# Patient Record
Sex: MALE | Race: BLACK OR AFRICAN AMERICAN | Employment: UNEMPLOYED | URBAN - METROPOLITAN AREA
[De-identification: names, ages, dates, MRNs, and addresses within clinical notes are randomized per-mention and may not be internally consistent; named-entity substitution may affect disease eponyms.]

---

## 2020-07-16 ENCOUNTER — HOSPITAL ENCOUNTER (EMERGENCY)
Age: 50
Discharge: HOME | End: 2020-07-16
Attending: EMERGENCY MEDICINE
Payer: COMMERCIAL

## 2020-07-16 VITALS
BODY MASS INDEX: 25.77 KG/M2 | HEIGHT: 70 IN | HEART RATE: 94 BPM | DIASTOLIC BLOOD PRESSURE: 92 MMHG | SYSTOLIC BLOOD PRESSURE: 122 MMHG | OXYGEN SATURATION: 99 % | RESPIRATION RATE: 16 BRPM | TEMPERATURE: 98.4 F | WEIGHT: 180 LBS

## 2020-07-16 PROCEDURE — 99282 EMERGENCY DEPT VISIT SF MDM: CPT

## 2020-07-16 RX ORDER — DOXYCYCLINE 100 MG/1
100 TABLET ORAL 2 TIMES DAILY
Qty: 20 TABLET | Refills: 0 | Status: SHIPPED | OUTPATIENT
Start: 2020-07-16 | End: 2020-07-26

## 2020-07-16 ASSESSMENT — PAIN SCALES - GENERAL: PAINLEVEL_OUTOF10: 10

## 2020-07-16 ASSESSMENT — PAIN DESCRIPTION - LOCATION: LOCATION: SCROTUM

## 2020-07-16 ASSESSMENT — PAIN DESCRIPTION - PAIN TYPE: TYPE: ACUTE PAIN

## 2020-07-16 NOTE — ED PROVIDER NOTES
Cardiovascular: 2+ radial pulses bilaterally. Respiratory: Unlabored breathing with equal chest rise and fall. Abdomen: Soft and nontender, without guarding or rebound tenderness. No masses or hepatosplenomegaly. : Normal-appearing external male genitalia. No penile lesions or discharge noted. On the inferior surface of the scrotum, approximately the midline, there are 2 small adjacent fluctuant nodules, consistent with small abscesses. There is significant associated induration and tenderness to palpation. Skin: Warm and dry, without rashes or ecchymoses, lacerations or abrasions. Neuro: Alert and oriented x3. No focal neurologic deficits are noted. Extremities: Warm and well-perfused, without clubbing, cyanosis, or edema. The patient moves all extremities equally. Psych: The patient's mood and affect are generally within normal limits for their presentation. Diagnostic Results       RADIOLOGY:  No orders to display       LABS:   No results found for this visit on 07/16/20. RECENT VITALS:  BP: (!) 122/92, Temp: 98.4 °F (36.9 °C), Pulse: 94, Resp: 16, SpO2: 99 %     Procedures       ED Course     Nursing Notes, Past Medical Hx, Past Surgical Hx, Social Hx, Allergies, and Family Hx were reviewed. The patient was given the following medications:  Orders Placed This Encounter   Medications    doxycycline monohydrate (ADOXA) 100 MG tablet     Sig: Take 1 tablet by mouth 2 times daily for 10 days     Dispense:  20 tablet     Refill:  0       CONSULTS:  None    MEDICAL DECISION MAKING / ASSESSMENT / Bernice Koh is a 53 y.o. male who presents with a two-week history of a \"knot\" on his scrotum. On examination, he has what appears to be a small scrotal wall abscess, with minimal surrounding cellulitis. I discussed with the patient that the appropriate treatment is incision and drainage in the emergency department, followed by antibiotics.   The patient was very reticent to allow incision and drainage. I spent some time explaining the procedure and the fact that treatment with antibiotics without source control has a relatively high failure rate. He was given some time to think about his decision. On further discussion with the patient, he continues to decline incision and drainage in the emergency department, understanding that treatment with antibiotics alone does have a high failure rate. He was given a referral to the general surgery clinic so that he may follow-up as an outpatient if the abscess persists. He was given return precautions for the emergency department should he have any increasing pain, swelling, fevers, vomiting, spreading warmth or redness, or other concerning symptoms. Clinical Impression     1. Scrotal wall abscess        Disposition     PATIENT REFERRED TO:  [facility]  [address] [facility] [address] [facility]    Schedule an appointment as soon as possible for a visit   If symptoms worsen      DISCHARGE MEDICATIONS:  Discharge Medication List as of 7/16/2020  7:52 AM      START taking these medications    Details   doxycycline monohydrate (ADOXA) 100 MG tablet Take 1 tablet by mouth 2 times daily for 10 days, Disp-20 tablet,R-0Print             DISPOSITION Decision To Discharge    (Please note that portions of this note were completed with voice recognition software.   Efforts were made to edit the dictations but occasionally words are mis-transcribed.)     Bethany Gonzalez MD  07/16/20 [id number]

## 2020-09-16 ENCOUNTER — APPOINTMENT (OUTPATIENT)
Dept: GENERAL RADIOLOGY | Age: 50
End: 2020-09-16
Payer: COMMERCIAL

## 2020-09-16 ENCOUNTER — HOSPITAL ENCOUNTER (EMERGENCY)
Age: 50
Discharge: HOME | End: 2020-09-16
Attending: EMERGENCY MEDICINE
Payer: COMMERCIAL

## 2020-09-16 VITALS
DIASTOLIC BLOOD PRESSURE: 86 MMHG | OXYGEN SATURATION: 100 % | TEMPERATURE: 98.2 F | HEART RATE: 62 BPM | SYSTOLIC BLOOD PRESSURE: 141 MMHG | RESPIRATION RATE: 18 BRPM

## 2020-09-16 PROCEDURE — 99283 EMERGENCY DEPT VISIT LOW MDM: CPT

## 2020-09-16 PROCEDURE — 73090 X-RAY EXAM OF FOREARM: CPT

## 2020-09-16 PROCEDURE — 73060 X-RAY EXAM OF HUMERUS: CPT

## 2020-09-16 PROCEDURE — 73562 X-RAY EXAM OF KNEE 3: CPT

## 2020-09-16 PROCEDURE — 6370000000 HC RX 637 (ALT 250 FOR IP): Performed by: PHYSICIAN ASSISTANT

## 2020-09-16 PROCEDURE — 73080 X-RAY EXAM OF ELBOW: CPT

## 2020-09-16 RX ORDER — OXYCODONE HYDROCHLORIDE 5 MG/1
5 TABLET ORAL ONCE
Status: COMPLETED | OUTPATIENT
Start: 2020-09-16 | End: 2020-09-16

## 2020-09-16 RX ORDER — ACETAMINOPHEN 500 MG
1000 TABLET ORAL ONCE
Status: COMPLETED | OUTPATIENT
Start: 2020-09-16 | End: 2020-09-16

## 2020-09-16 RX ORDER — IBUPROFEN 400 MG/1
800 TABLET ORAL ONCE
Status: COMPLETED | OUTPATIENT
Start: 2020-09-16 | End: 2020-09-16

## 2020-09-16 RX ADMIN — OXYCODONE 5 MG: 5 TABLET ORAL at 12:51

## 2020-09-16 RX ADMIN — IBUPROFEN 800 MG: 400 TABLET ORAL at 11:36

## 2020-09-16 RX ADMIN — ACETAMINOPHEN 1000 MG: 500 TABLET ORAL at 11:36

## 2020-09-16 ASSESSMENT — PAIN DESCRIPTION - ONSET: ONSET: ON-GOING

## 2020-09-16 ASSESSMENT — PAIN DESCRIPTION - FREQUENCY: FREQUENCY: CONTINUOUS

## 2020-09-16 ASSESSMENT — PAIN DESCRIPTION - PAIN TYPE: TYPE: ACUTE PAIN

## 2020-09-16 ASSESSMENT — PAIN SCALES - GENERAL
PAINLEVEL_OUTOF10: 10

## 2020-09-16 ASSESSMENT — PAIN DESCRIPTION - DESCRIPTORS: DESCRIPTORS: SHARP;STABBING

## 2020-09-16 ASSESSMENT — PAIN DESCRIPTION - LOCATION: LOCATION: ARM;KNEE

## 2020-09-16 NOTE — ED NOTES
Patient prepared for and ready to be discharged. Patient discharged at this time in no acute distress after verbalizing understanding of discharge instructions. Patient left after receiving After Visit Summary instructions.         Evaristo Spurling, RN  09/16/20 0842

## 2020-09-16 NOTE — ED PROVIDER NOTES
ED Attending Attestation Note     Date of evaluation: 9/16/2020    This patient was seen by the DAVID. I have seen and examined the patient, agree with the workup, evaluation, management and diagnosis. The care plan has been discussed. I was present for any procedures performed in the DAVID's note and have made edits to the note where appropriate. My assessment reveals 53 y.o. male presenting after head injury while using a riding lawnmower. The standing platform on the back fell off and he notes a twisting and popping pain in his left knee as well as falling on his right elbow. Patient is alert, GCS 15. Has significant swelling to the supracondylar posterior region just proximal to the right elbow. Neurovascularly intact distal to this. Has some mild tenderness along the medial aspect of the joint line of the left knee with an occasional click and pop with range of motion. Has been able to bear weight and ambulate. Suspect possible meniscal injury or mild MCL injury. Will obtain x-rays of the knee and elbow area.          Vonda Sullivan MD  09/16/20 1145

## 2020-09-16 NOTE — ED PROVIDER NOTES
Mouth/Throat:      Mouth: Mucous membranes are moist.   Eyes:      Conjunctiva/sclera: Conjunctivae normal.   Neck:      Musculoskeletal: Normal range of motion. No muscular tenderness. Cardiovascular:      Rate and Rhythm: Normal rate. Pulses: Normal pulses. Pulmonary:      Effort: Pulmonary effort is normal. No respiratory distress. Abdominal:      Palpations: Abdomen is soft. Musculoskeletal:      Comments: No tenderness to midline T or L-spine. Swelling noted to right elbow with tenderness to palpation of radial head. Full range of motion at wrist and all digits. 2+ radial pulse. Sensation intact to entire extremity. Mild tenderness noted to distal humerus and proximal forearm as well. No tenderness to palpation over clavicle or shoulder. Left upper extremity normal.  Right lower extremity normal.  Full range of motion of left lower extremity, specifically at knee joint, with mild tenderness along medial joint line. No significant laxity noted. Negative Lachman's test.  No significant joint effusion noted. Able to ambulate without difficulty. Skin:     General: Skin is warm and dry. Neurological:      General: No focal deficit present. Mental Status: He is alert and oriented to person, place, and time. Diagnostic Results     RADIOLOGY:  XR HUMERUS RIGHT (MIN 2 VIEWS)   Final Result   1. Bipartite patella knee, no fracture or dislocation   2. Degenerative changes of the right elbow with no fracture or dislocation. 3. Normal right humerus   4. Radius and ulna are normal.      XR ELBOW RIGHT (MIN 3 VIEWS)   Final Result   1. Bipartite patella knee, no fracture or dislocation   2. Degenerative changes of the right elbow with no fracture or dislocation. 3. Normal right humerus   4. Radius and ulna are normal.      XR RADIUS ULNA RIGHT (2 VIEWS)   Final Result   1. Bipartite patella knee, no fracture or dislocation   2.  Degenerative changes of the right elbow with no was given a referral to orthopedics team for follow-up as an outpatient and was discharged in stable condition and able to ambulate out of the emergency department without difficulty. This patient was also evaluated by the attending physician. All care plans were discussed and agreed upon. Clinical Impression     1. Fall, initial encounter    2. Acute pain of left knee    3.  Right elbow pain        Disposition     PATIENT REFERRED TO:  Eladio Ramirez MD  [address] [place] [address]  [phone]    Schedule an appointment as soon as possible for a visit         DISCHARGE MEDICATIONS:  Discharge Medication List as of 9/16/2020 12:48 PM          DISPOSITION Decision To Discharge 09/16/2020 12:19:16 PM        Aure Shirley PA-C  09/16/20 9733

## 2020-09-16 NOTE — ED TRIAGE NOTES
Pt was mowing lawn on a standing  and fell off. Pt states that he was dragged for a bit and now has a knot above the R elbow. Pt also hit L knee on the ground.

## 2020-09-24 ENCOUNTER — OFFICE VISIT (OUTPATIENT)
Dept: ORTHOPEDIC SURGERY | Age: 50
End: 2020-09-24
Payer: COMMERCIAL

## 2020-09-24 VITALS — WEIGHT: 180 LBS | BODY MASS INDEX: 25.77 KG/M2 | HEIGHT: 70 IN

## 2020-09-24 PROCEDURE — 99203 OFFICE O/P NEW LOW 30 MIN: CPT | Performed by: ORTHOPAEDIC SURGERY

## 2020-09-24 PROCEDURE — G8419 CALC BMI OUT NRM PARAM NOF/U: HCPCS | Performed by: ORTHOPAEDIC SURGERY

## 2020-09-24 PROCEDURE — G8427 DOCREV CUR MEDS BY ELIG CLIN: HCPCS | Performed by: ORTHOPAEDIC SURGERY

## 2020-09-24 PROCEDURE — 4004F PT TOBACCO SCREEN RCVD TLK: CPT | Performed by: ORTHOPAEDIC SURGERY

## 2020-09-24 NOTE — PROGRESS NOTES
hematoma formation in this area with no overlying skin changes  Minimal swelling throughout the remainder of the forearm arm and elbow    Palpation: Tenderness to palpation near triceps insertion with no obvious palpable gap but palpable swelling is noted so difficult to palpate any bony or tendinous anatomy in this area  No tenderness globally throughout the remainder of the elbow forearm or wrist    Range of Motion: Gentle range of motion testing today from approximately 10 to 130 degrees of flexion with no obvious crepitus or gross instability and mild discomfort particularly with terminal flexion    Strength: 5 out of 5 strength all major muscle groups C5-T1 right upper extremity except weakness and discomfort with attempted resisted elbow extension particularly in a position of hyperflexion to extension the patient does have weakness in approximately 4 out of 5 strength    Special Tests: Gross sensation intact median ulnar and radial nerve without deficit  2+ palpable radial pulse with brisk capillary refill all fingers    Skin: There are no additional worrisome rashes, ulcerations or lesions. Gait: normal    Circulation normal    Additional Comments:     Additional Examinations:  Left Upper Extremity: Examination of the left upper extremity does not show any tenderness, deformity or injury. Range of motion is unremarkable. There is no gross instability. There are no rashes, ulcerations or lesions. Strength and tone are normal.      Radiology:     X-rays reviewed in office:  Images of the right elbow and humerus from 9/16/2020 reviewed which demonstrate enthesophyte and there did appear to be several corticated fragments with 1 fragment on lateral imaging proximal to the olecranon process suspicious for possible bony marissa or avulsion      Assessment: 50-year-old male presenting with history of right elbow injury after being forcefully pulled from a lawnmower  1.   Right elbow swelling and pain, suspicion for right triceps tendon avulsion    Impression:  Encounter Diagnosis   Name Primary?  Right elbow pain Yes       Office Procedures:  Orders Placed This Encounter   Procedures    MRI ELBOW RIGHT WO CONTRAST     Standing Status:   Future     Standing Expiration Date:   9/24/2021     Scheduling Instructions:      MRI Right Elbow      R/O Triceps tendon tear            Once Approved Patient will Schedule at [facility] 1574: I had a discussion with the patient today regarding his presentation. Based on the swelling posteriorly as well as his x-ray suspicious for possible displaced cortical fragment and his weakness on exam I have a suspicion for triceps tendon avulsion.   The patient does not have any palpable gap today but he does have some swelling posteriorly near the triceps insertion making is difficult to palpate or detect  At this time I recommend prompt further work-up including obtaining advanced imaging with MRI of the right elbow to evaluate for possible triceps tendon avulsion or injury  He may remain in the sling for now and may remove several times per day just to work on some range of motion for his shoulder wrist and light range of motion of the elbow but no forceful lifting gripping or use of the right upper extremity otherwise  We will plan to follow-up after MRI is completed to discuss results and further treatment options based on results

## 2020-09-25 ENCOUNTER — TELEPHONE (OUTPATIENT)
Dept: ORTHOPEDIC SURGERY | Age: 50
End: 2020-09-25

## 2020-09-25 NOTE — TELEPHONE ENCOUNTER
SPOKE WITH PATIENT TO INFORM HIM MRI WAS APPROVED & [facility]

## 2020-10-02 ENCOUNTER — TELEPHONE (OUTPATIENT)
Dept: ORTHOPEDIC SURGERY | Age: 50
End: 2020-10-02

## 2020-10-02 NOTE — TELEPHONE ENCOUNTER
Called to schedule his follow up visit to go over MRI Results. He must make this appointment on 10/6/2020.

## 2020-10-05 ENCOUNTER — TELEPHONE (OUTPATIENT)
Dept: ORTHOPEDIC SURGERY | Age: 50
End: 2020-10-05

## 2020-10-05 NOTE — TELEPHONE ENCOUNTER
SPOKE WITH PATIENT STATES HE WAS UNABLE TO GET HIS MRI COMPLETED 10/3 AT [facility] DUE TO HAVING A  ANKLE MONITOR ON. STATES HE WILL CONTACT HIS  TO FIGURE OUT A WAY TO HAVE THE MRI DONE.

## 2020-10-06 ENCOUNTER — TELEPHONE (OUTPATIENT)
Dept: ORTHOPEDIC SURGERY | Age: 50
End: 2020-10-06

## 2020-10-06 NOTE — TELEPHONE ENCOUNTER
CALLED PATIENT TO INFORM HIM  HE WILL NEED TO HAVE AN ULTRASOUND DONE TODAY  IF HE'S UNABLE TO GET AN MRI COMPLETED.- LEFT VM MESSAGE

## 2020-10-07 ENCOUNTER — TELEPHONE (OUTPATIENT)
Dept: ORTHOPEDIC SURGERY | Age: 50
End: 2020-10-07

## 2020-10-07 NOTE — TELEPHONE ENCOUNTER
RETURNED SPOUSE (Kris Jones ) CALL- WANTED TO SCHEDULE ULTRASOUND W/ DR. Donita Alas 94 FOR 10/8 @ 1:30PM

## 2020-10-08 ENCOUNTER — OFFICE VISIT (OUTPATIENT)
Dept: ORTHOPEDIC SURGERY | Age: 50
End: 2020-10-08
Payer: COMMERCIAL

## 2020-10-08 VITALS — TEMPERATURE: 98.5 F | BODY MASS INDEX: 25.75 KG/M2 | HEIGHT: 70 IN | WEIGHT: 179.9 LBS

## 2020-10-08 PROCEDURE — 99242 OFF/OP CONSLTJ NEW/EST SF 20: CPT | Performed by: INTERNAL MEDICINE

## 2020-10-08 PROCEDURE — G8419 CALC BMI OUT NRM PARAM NOF/U: HCPCS | Performed by: INTERNAL MEDICINE

## 2020-10-08 PROCEDURE — G8427 DOCREV CUR MEDS BY ELIG CLIN: HCPCS | Performed by: INTERNAL MEDICINE

## 2020-10-08 PROCEDURE — G8484 FLU IMMUNIZE NO ADMIN: HCPCS | Performed by: INTERNAL MEDICINE

## 2020-10-08 NOTE — PROGRESS NOTES
Highest education level: Not on file   Occupational History    Not on file   Social Needs    Financial resource strain: Not on file    Food insecurity     Worry: Not on file     Inability: Not on file    Transportation needs     Medical: Not on file     Non-medical: Not on file   Tobacco Use    Smoking status: Current Every Day Smoker     Packs/day: 0.25     Types: Cigarettes    Smokeless tobacco: Never Used   Substance and Sexual Activity    Alcohol use: Yes     Comment: socially    Drug use: Yes     Types: Marijuana     Comment: former drug abuse    Sexual activity: Yes     Partners: Female   Lifestyle    Physical activity     Days per week: Not on file     Minutes per session: Not on file    Stress: Not on file   Relationships    Social connections     Talks on phone: Not on file     Gets together: Not on file     Attends [employer]ism service: Not on file     Active member of club or organization: Not on file     Attends meetings of clubs or organizations: Not on file     Relationship status: Not on file    Intimate partner violence     Fear of current or ex partner: Not on file     Emotionally abused: Not on file     Physically abused: Not on file     Forced sexual activity: Not on file   Other Topics Concern    Not on file   Social History Narrative    Not on file        Review of Symptoms:    Pertinent items are noted in HPI    Review of systems reviewed from Patient History Form dated on 9/24/2020 and   available in the patient's chart under the Media tab. Vital Signs:      Vitals:    10/08/20 1357   Temp: 98.5 °F (36.9 °C)        General Exam:     Constitutional: Patient is adequately groomed with no evidence of malnutrition  Mental Status: The patient is oriented to time, place and person. The patient's mood and affect are appropriate. Vascular: Examination reveals no swelling or calf tenderness. Peripheral pulses are palpable and 2+.     Lymphatics: no lymphadenopathy of the inguinal region or lower extremity      Physical Exam: right arm      Primary Exam:    Inspection: There is asymmetric fullness/bulge distal triceps insertion region      Palpation: Tenderness distal triceps needle distribution      Range of Motion: Full range low-grade discomfort end range flexion      Strength: Submaximal resisted with pain in extension      Special Tests: Negative      Skin: There are no rashes, ulcerations or lesions. Gait: Nonantalgic      Neurovascular - non focal and intact       Additional Comments:        Additional Examinations:                   Office Imaging Results/Procedures PerformedToday:         Office Procedures:     Orders Placed This Encounter   Procedures    US EXTREMITY RIGHT NON VASC LIMITED     Standing Status:   Future     Number of Occurrences:   1     Standing Expiration Date:   10/8/2021     Order Specific Question:   Reason for exam:     Answer:   dx       Limited ultrasound evaluation of the right elbow  Logic E ultrasound 10 to 12 MHz standard and virtual convex imaging utilized      Findings: Partial thickness incomplete tear (30%) of the distal triceps tendon with retraction and avulsion of cortical fragments-superficial and medial fiber distribution. The patient was position seated with the elbow flexed palm of his hand resting on his thigh. The extensor aspect the elbow was evaluated extensively with linear transducer. The triceps tendon was evaluated extensively long axis and short axis. There was evidence of a incomplete partial thickness rupture of the superior medial fibers of the triceps tendon with retraction. Rupture extending approximately with involvement of the distal myotendinous junction with loss of convexity of the contour of the tendon in this anatomic location consistent retracted tear. Cortical avulsion fragment is apparent sonographically and on x-ray located approximately 3 cm proximal to the olecranon tip.   The tear resents approximately 30% thickness of the tendon. With dynamic stressing and passive stressing of the triceps there is no evidence of complete rupture or gapping to suggest complete rupture. There is subcutis edema and hemorrhagic fluid signal within the superior aspect of the triceps tendon in this anatomic distribution. There is tenderness to sono palpation at this anatomic location. This anatomic location correlates with the mild \"bulge\" evident on clinical examination. Other Outside Imaging and Testing Personally Reviewed:    Us Extremity Right Non Vasc Limited    Result Date: 10/8/2020  Radiology result is complete; follow up with provider / physician office for radiology results      X-rays reviewed 9/24/2020        Assessment   Impression: . Encounter Diagnoses   Name Primary?  Traumatic rupture of triceps tendon, right, initial encounter     Right elbow pain Yes        Partial incomplete triceps tendon tear superficial fiber diffuse distribution    Plan: Active modification-triceps strain protocol  Clinical follow-up with Dr. Noé Gomez to discuss treatment options. The nature of the finding, probable diagnosis and likely treatment was thoroughly discussed with the patient. The options, risks, complications, alternative treatment as well as some of the differential diagnosis was discussed. The patient was thoroughly informed and all questions were answered. the patient indicated understanding and satisfaction with the discussion. Orders:        Orders Placed This Encounter   Procedures    US EXTREMITY RIGHT NON VASC LIMITED     Standing Status:   Future     Number of Occurrences:   1     Standing Expiration Date:   10/8/2021     Order Specific Question:   Reason for exam:     Answer:   dx           Disclaimer: \"This note was dictated with voice recognition software. Though review and correction are routine, we apologize for any errors. \"

## 2020-10-14 ENCOUNTER — OFFICE VISIT (OUTPATIENT)
Dept: ORTHOPEDIC SURGERY | Age: 50
End: 2020-10-14
Payer: COMMERCIAL

## 2020-10-14 PROCEDURE — G8427 DOCREV CUR MEDS BY ELIG CLIN: HCPCS | Performed by: ORTHOPAEDIC SURGERY

## 2020-10-14 PROCEDURE — 99212 OFFICE O/P EST SF 10 MIN: CPT | Performed by: ORTHOPAEDIC SURGERY

## 2020-10-14 PROCEDURE — 4004F PT TOBACCO SCREEN RCVD TLK: CPT | Performed by: ORTHOPAEDIC SURGERY

## 2020-10-14 PROCEDURE — G8484 FLU IMMUNIZE NO ADMIN: HCPCS | Performed by: ORTHOPAEDIC SURGERY

## 2020-10-14 PROCEDURE — G8419 CALC BMI OUT NRM PARAM NOF/U: HCPCS | Performed by: ORTHOPAEDIC SURGERY

## 2020-10-14 NOTE — PROGRESS NOTES
Assessment: 27-year-old male presenting with history of right elbow injury after being forcefully pulled from a lawnmower  1. Right elbow partial triceps tear and avulsion, approximately 30% based on ultrasound  Treatment Plan: I discussed with the patient today regarding his injury and we discussed results of ultrasound. He does appear to have partial triceps tendon tear which does correlate with his exam today  We discussed treatment options and based on his current exam as well as his ultrasound I think that his injury would be amenable to consider conservative management. We discussed both operative and nonoperative treatment. [place] decision was made to proceed with nonoperative treatment but I would like him specifically to rest and protect his triceps while healing can occur. Specifically we discussed avoiding forceful hyperextension of his elbow and triceps activation including no dips or push-ups forceful or body weight, and also no isolated triceps extension exercises for now. We will plan to see him back in 1 month for repeat evaluation    No follow-ups on file. History of Present Illness  Hemalatha Stokes is a 53 y.o. male   , right-hand-dominant, works as a , presenting with history of right elbow injury  The patient reports a history of any fall approximately 1 week ago. The patient was cutting grass at his home when the foot pedal broke on the lawnmower when he was standing up and the mower subsequently pulled him forcefully. The patient felt a pull and heard a pop in his right elbow at the time  He was seen in the emergency department where images were obtained demonstrating reportedly no evidence of an acute fracture    Interval history: Patient presents today approximately 3 weeks status post injury  We had attempted to obtain an MRI for the patient with suspected triceps tendon injury.   He was unable to get the MRI but he did have an ultrasound performed last week to evaluate for triceps injury. Ultrasound study did demonstrate partial tear of distal triceps tendon approximately 30% estimated from ultrasound  The patient has been feeling better since his last visit. He does continue to have some tenderness. He has started doing some workout including push-ups and other exercises. No other new injuries or complaints reported today    Review of Systems  Pertinent items are noted in HPI  Review of systems reviewed from Patient History Form dated on 9/24/20 and available in the patient's chart under the Media tab. Vital Signs  There were no vitals filed for this visit. Physical Exam  Constitutional:  Patient is well-nourished and demonstrates normal hygiene. Mental Status:  Patient is alert and oriented to person, place and time. Skin:  Intact, no rashes or lesions.      Right Elbow/right upper extremity examination  Inspection:  Mild swelling at the posterior elbow just proximal to the olecranon process with what appears to be small hematoma formation in this area with no overlying skin changes  No swelling throughout the remainder of the forearm arm and elbow     Palpation:  No tenderness to palpation near triceps insertion with no obvious palpable gap but palpable swelling is noted so difficult to palpate any bony or tendinous anatomy in this area  No tenderness globally throughout the remainder of the elbow forearm or wrist     Range of Motion: Gentle range of motion testing today from approximately 0 to 1 140 degrees of flexion with no obvious crepitus or gross instability and mild discomfort particularly with terminal flexion     Strength: 5 out of 5 strength all major muscle groups C5-T1 right upper extremity, 5 out of 5 strength with elbow extension including at hyperflexion into extension with very minimal discomfort  No crepitus throughout elbow range of motion  Special Tests: Gross sensation intact median ulnar and radial nerve without deficit  2+ palpable radial pulse with brisk capillary refill all fingers    Capillary refill brisk all fingers, symmetric  Gross sensation intact to light touch median/ulnar/radial nerves  Sensation intact to radial/ulnar aspect of fingertip        Radiology:    X-rays obtained and reviewed in office:  No new x-rays obtained today    Additional Diagnostic Test Findings:  Findings: Partial thickness incomplete tear (30%) of the distal triceps tendon with retraction and avulsion of cortical fragments-superficial and medial fiber distribution. Office Procedures:  No orders of the defined types were placed in this encounter. Julissa Harrell MD  Orthopaedic Surgeon, Hand & Upper Extremity   [facility]    Contact Information:  Chan Radford: [phone] Clinical )    This dictation was performed with a verbal recognition program [facility] [employer] [facility]) and it was checked for errors. It is possible that there are still dictated errors within this office note. If so, please bring any errors to my attention for an addendum. All efforts were made to ensure that this office note is accurate.

## 2022-05-06 ENCOUNTER — HOSPITAL ENCOUNTER (EMERGENCY)
Age: 52
Discharge: HOME | End: 2022-05-06
Attending: STUDENT IN AN ORGANIZED HEALTH CARE EDUCATION/TRAINING PROGRAM
Payer: COMMERCIAL

## 2022-05-06 ENCOUNTER — APPOINTMENT (OUTPATIENT)
Dept: GENERAL RADIOLOGY | Age: 52
End: 2022-05-06
Payer: COMMERCIAL

## 2022-05-06 VITALS
SYSTOLIC BLOOD PRESSURE: 131 MMHG | RESPIRATION RATE: 17 BRPM | HEART RATE: 82 BPM | DIASTOLIC BLOOD PRESSURE: 88 MMHG | OXYGEN SATURATION: 100 % | TEMPERATURE: 99.6 F

## 2022-05-06 DIAGNOSIS — B34.9 VIRAL ILLNESS: Primary | ICD-10-CM

## 2022-05-06 LAB
INFLUENZA A: NOT DETECTED
INFLUENZA B: NOT DETECTED
SARS-COV-2 RNA, RT PCR: NOT DETECTED

## 2022-05-06 PROCEDURE — 99284 EMERGENCY DEPT VISIT MOD MDM: CPT

## 2022-05-06 PROCEDURE — 71046 X-RAY EXAM CHEST 2 VIEWS: CPT

## 2022-05-06 PROCEDURE — 87636 SARSCOV2 & INF A&B AMP PRB: CPT

## 2022-05-06 RX ORDER — DOXYCYCLINE HYCLATE 100 MG
100 TABLET ORAL 2 TIMES DAILY
Qty: 14 TABLET | Refills: 0 | Status: SHIPPED | OUTPATIENT
Start: 2022-05-06 | End: 2022-05-13

## 2022-05-06 ASSESSMENT — PAIN DESCRIPTION - LOCATION: LOCATION: GENERALIZED

## 2022-05-06 ASSESSMENT — PAIN SCALES - GENERAL: PAINLEVEL_OUTOF10: 6

## 2022-05-06 ASSESSMENT — PAIN - FUNCTIONAL ASSESSMENT: PAIN_FUNCTIONAL_ASSESSMENT: 0-10

## 2022-05-06 NOTE — ED TRIAGE NOTES
Patient arrives c/o fever and bodyaches since yesterday afternoon. Patient denies any other symptoms. Patient states that a coworker of his has been coughing but his coworker told him that it was just allergies.

## 2022-05-06 NOTE — ED PROVIDER NOTES
[address] [facility]          ATTENDING PHYSICIAN NOTE       Date of evaluation: 5/6/2022    Chief Complaint     Fever and Generalized Body Aches      History of Present Illness     Tyrese Hodges is a 47 y.o. male who presents with fever and body aches. He has been exposed to someone at work who has been coughing. He reports that he is vaccinated for flu and COVID. He denies any shortness of breath or productive cough. He denies any abdominal pain, diarrhea, nausea, vomiting. He denies any urinary symptoms. He has been taking some Children's Motrin at home which did help with the fevers. Denies any sore throat or rash. Review of Systems     Positive for: Fevers, myalgias  Negative for: Cough, shortness of breath, urinary symptoms, abdominal pain    Other systems reviewed and negative. Past Medical, Surgical, Family, and Social History     He has no past medical history on file. He has no past surgical history on file. His family history is not on file. He reports that he has been smoking cigarettes. He has been smoking about 0.25 packs per day. He has never used smokeless tobacco. He reports current alcohol use. He reports current drug use. Drug: Marijuana [employer]). Medications     Discharge Medication List as of 5/6/2022  7:45 PM          Allergies     He has No Known Allergies.     Physical Exam     INITIAL VITALS: BP: (!) 131/93, Temp: (S) 101.6 °F (38.7 °C), Pulse: 84, Resp: 18, SpO2: 100 %     General: nontoxic, no acute distress   HEENT: NCAT, EOMI grossly intact, external nose normal, no stridor  Neck: supple, no pain with movement  Cardiac: normal rate, regular rhythm, well perfused  Respiratory: clear to auscultation bilaterally, no respiratory distress, no cough  Abdominal: soft, nontender to palpation, no rebound tenderness  Extremities: no pitting edema  Musculoskeletal: no long bone deformities  Skin: no diaphoresis, no rash  Neuro: alert and oriented, moving extremities symmetrically, clear speech  Psych: appropriate mood, normal affect    Diagnostic Results     EKG    N/A    RADIOLOGY:  XR CHEST (2 VW)   Final Result      Ill-defined left basilar airspace disease. This could reflect pneumonia given the history. Follow-up chest radiograph is recommended to document resolution. LABS:   Results for orders placed or performed during the hospital encounter of 05/06/22   COVID-19 & Influenza Combo    Specimen: Nasopharyngeal Swab   Result Value Ref Range    SARS-CoV-2 RNA, RT PCR NOT DETECTED NOT DETECTED    INFLUENZA A NOT DETECTED NOT DETECTED    INFLUENZA B NOT DETECTED NOT DETECTED       ED BEDSIDE ULTRASOUND:   N/A    RECENT VITALS:  BP: 131/88,Temp: 99.6 °F (37.6 °C), Pulse: 82, Resp: 17, SpO2: 100 %     Procedures      N/A    ED Course     Nursing Notes, Past Medical Hx, Past Surgical Hx, Social Hx,Allergies, and Family Hx were reviewed. patient was given the following medications:  Orders Placed This Encounter   Medications    doxycycline hyclate (VIBRA-TABS) 100 MG tablet     Sig: Take 1 tablet by mouth 2 times daily for 7 days     Dispense:  14 tablet     Refill:  0       CONSULTS:  None    MEDICAL DECISIONMAKING / ASSESSMENT / Deirdre Severance is a 47 y.o. male with myalgias and fevers. COVID and flu test negative here. He denies any productive cough, however x-ray does show possible early pneumonia on the left. Clinically, he does not have pneumonia but I discussed that since he is having true fevers and myalgias we can write him a prescription for doxycycline so if he develops a productive cough or shortness of breath he should start his prescription. He also talked about other viruses being possible causes of myalgias and fevers. He denies any urinary symptoms have low suspicion for urinary tract infection. His abdomen is soft and nontender and have low suspicion for intra-abdominal infection.     No neck pain and normal mental status, low suspicion for CNS infection. Overall, I counseled hydration and supportive treatment and patient is comfortable with this plan. Stable for discharge    Clinical Impression     1. Viral illness        Disposition     PATIENT REFERRED TO:  No follow-up provider specified.     DISCHARGE MEDICATIONS:  Discharge Medication List as of 5/6/2022  7:45 PM      START taking these medications    Details   doxycycline hyclate (VIBRA-TABS) 100 MG tablet Take 1 tablet by mouth 2 times daily for 7 days, Disp-14 tablet, R-0Print             DISPOSITION Decision To Discharge 05/06/2022 07:36:18 PM     Leanne Carlisle MD  05/06/22 2030

## 2022-07-18 ENCOUNTER — HOSPITAL ENCOUNTER (EMERGENCY)
Age: 52
Discharge: HOME | End: 2022-07-18
Attending: EMERGENCY MEDICINE
Payer: COMMERCIAL

## 2022-07-18 VITALS
HEART RATE: 69 BPM | SYSTOLIC BLOOD PRESSURE: 120 MMHG | OXYGEN SATURATION: 99 % | DIASTOLIC BLOOD PRESSURE: 87 MMHG | RESPIRATION RATE: 17 BRPM | TEMPERATURE: 98.3 F

## 2022-07-18 DIAGNOSIS — G89.29 CHRONIC LEFT-SIDED LOW BACK PAIN WITH SCIATICA, SCIATICA LATERALITY UNSPECIFIED: Primary | ICD-10-CM

## 2022-07-18 DIAGNOSIS — M54.40 CHRONIC LEFT-SIDED LOW BACK PAIN WITH SCIATICA, SCIATICA LATERALITY UNSPECIFIED: Primary | ICD-10-CM

## 2022-07-18 LAB
BILIRUBIN URINE: NEGATIVE
BLOOD, URINE: ABNORMAL
CLARITY: CLEAR
COLOR: YELLOW
GLUCOSE URINE: NEGATIVE MG/DL
KETONES, URINE: NEGATIVE MG/DL
LEUKOCYTE ESTERASE, URINE: NEGATIVE
MICROSCOPIC EXAMINATION: YES
NITRITE, URINE: NEGATIVE
PH UA: 6.5 (ref 5–8)
PROTEIN UA: NEGATIVE MG/DL
RBC UA: NORMAL /HPF (ref 0–4)
RENAL EPITHELIAL, UA: NORMAL /HPF (ref 0–1)
SPECIFIC GRAVITY UA: 1.01 (ref 1–1.03)
URINE REFLEX TO CULTURE: ABNORMAL
URINE TYPE: ABNORMAL
UROBILINOGEN, URINE: 0.2 E.U./DL
WBC UA: NORMAL /HPF (ref 0–5)

## 2022-07-18 PROCEDURE — 81001 URINALYSIS AUTO W/SCOPE: CPT

## 2022-07-18 PROCEDURE — 99283 EMERGENCY DEPT VISIT LOW MDM: CPT

## 2022-07-18 RX ORDER — METHYLPREDNISOLONE 4 MG/1
TABLET ORAL
Qty: 21 TABLET | Refills: 0 | Status: SHIPPED | OUTPATIENT
Start: 2022-07-18 | End: 2022-07-24

## 2022-07-18 RX ORDER — METHOCARBAMOL 500 MG/1
500 TABLET, FILM COATED ORAL 4 TIMES DAILY
Qty: 20 TABLET | Refills: 0 | Status: SHIPPED | OUTPATIENT
Start: 2022-07-18 | End: 2022-07-23

## 2022-07-18 RX ORDER — METHYLPREDNISOLONE 4 MG/1
TABLET ORAL
Qty: 21 TABLET | Refills: 0 | Status: SHIPPED | OUTPATIENT
Start: 2022-07-18 | End: 2022-07-18

## 2022-07-18 ASSESSMENT — PAIN DESCRIPTION - ONSET: ONSET: ON-GOING

## 2022-07-18 ASSESSMENT — ENCOUNTER SYMPTOMS
SHORTNESS OF BREATH: 0
EYES NEGATIVE: 1
WHEEZING: 0
CONSTIPATION: 0
COUGH: 0
ABDOMINAL PAIN: 0
CHEST TIGHTNESS: 0
BACK PAIN: 1
DIARRHEA: 0
VOMITING: 0
NAUSEA: 0

## 2022-07-18 ASSESSMENT — PAIN DESCRIPTION - ORIENTATION: ORIENTATION: LEFT;LOWER

## 2022-07-18 ASSESSMENT — PAIN - FUNCTIONAL ASSESSMENT: PAIN_FUNCTIONAL_ASSESSMENT: 0-10

## 2022-07-18 ASSESSMENT — PAIN SCALES - GENERAL: PAINLEVEL_OUTOF10: 10

## 2022-07-18 ASSESSMENT — PAIN DESCRIPTION - FREQUENCY: FREQUENCY: CONTINUOUS

## 2022-07-18 ASSESSMENT — PAIN DESCRIPTION - PAIN TYPE: TYPE: ACUTE PAIN

## 2022-07-18 ASSESSMENT — PAIN DESCRIPTION - LOCATION: LOCATION: BACK

## 2022-07-18 NOTE — DISCHARGE INSTRUCTIONS
Take steroid as directed per box instructions. Follow up with sports medicine as needed if pain doesn't get better in a week. Back pain care recommendations:  Taking care of yourself at home: Do not avoid exercise or work. Your back will likely heal faster if you return to being active before your pain is gone. Do not sit, drive, or  one place for more than 30 minutes at a time. Do not stay in bed during the day. Resting more than 1 or 2 days can delay your recovery. Find a comfortable position to sleep. Lie on your side with your knees slightly bent. If you lie on your back, put a pillow under your knees. Stay well-hydrated  Put ice on the injured area for 15-20 minutes, 3-4 times a day for the first 2 to 3 days. After that, ice and heat may be alternated to reduce pain and spasms. GET HELP RIGHT AWAY IF:   The pain spreads into your legs. You cannot control when you poop (bowel movement) or pee (urinate). Your arms or legs feel weak or lose feeling (numbness). You feel sick to your stomach (nauseous) or throw up (vomit). You have belly (abdominal) pain. You feel like you may pass out (faint).   Other concerns

## 2022-07-18 NOTE — ED PROVIDER NOTES
[address] ENCOUNTER          PHYSICIAN ASSISTANT NOTE     Date of evaluation: 7/18/2022    Chief Complaint     Back Pain (Pt states lower left back pain started 2 months ago. Has tried pain meds (that he bought), lidocaine patches, ice/heat and nothing helps. Pt denies any urinary problems at this time. Pt states it is constant and nothing has helped. States hurts his back to even cough. )    History of Present Illness     Hussein Roy is a 47 y.o. male who presents to the emergency department with approximately 2 months of left lower back pain. The patient states that he does not remember any sort of trauma, injury or blows to the back. He states that 1 day it is started hurting him. He states that is located in his left lower back and radiates down his left buttock down his leg into his toes. He states that this pain is more throbbing and burning-like. He states that he has tried a bunch of remedies including Lidoderm patches, ice/heat as well as some pain medication that he bought off the streets (percocet). He states that none of these remedies have been really helping him. They only mask the pain for so long. He states that he has never had this pain in the past.  He states that it is constant and coughing and sitting make his pain worse. He states that his pain is a 10/10. He denies any history of kidney stones or blood in his urine. No saddle anesthesia, no IVDU, no urinary retention/incontinence or bowel incontinence. Review of Systems     Review of Systems   Constitutional:  Negative for chills, diaphoresis and fever. HENT: Negative. Eyes: Negative. Respiratory:  Negative for cough, chest tightness, shortness of breath and wheezing. Cardiovascular:  Negative for chest pain and palpitations. Gastrointestinal:  Negative for abdominal pain, constipation, diarrhea, nausea and vomiting. Genitourinary:  Negative for frequency and urgency. Musculoskeletal:  Positive for back pain. Negative for arthralgias and myalgias. Skin: Negative. Negative for rash. Neurological:  Negative for dizziness and headaches. Hematological:  Negative for adenopathy. Psychiatric/Behavioral: Negative. All other systems reviewed and are negative. Past Medical, Surgical, Family, and Social History     He has no past medical history on file. He has no past surgical history on file. His family history is not on file. He reports that he has been smoking cigarettes. He has been smoking an average of .25 packs per day. He has never used smokeless tobacco. He reports current alcohol use of about 4.0 standard drinks per week. He reports current drug use. Drugs: Marijuana (Weed) and Opiates . Medications     Discharge Medication List as of 7/18/2022 11:36 AM        Allergies     He has No Known Allergies. Physical Exam     INITIAL VITALS: BP: 120/87, Temp: 98.3 °F (36.8 °C), Heart Rate: 69, Resp: 17, SpO2: 99 %  Physical Exam  Vitals and nursing note reviewed. Constitutional:       General: He is not in acute distress. Appearance: Normal appearance. He is well-developed. He is not diaphoretic. HENT:      Head: Normocephalic and atraumatic. Mouth/Throat:      Mouth: Mucous membranes are moist.      Pharynx: Oropharynx is clear. Eyes:      Conjunctiva/sclera: Conjunctivae normal.      Pupils: Pupils are equal, round, and reactive to light. Cardiovascular:      Rate and Rhythm: Normal rate and regular rhythm. Pulmonary:      Effort: Pulmonary effort is normal.      Breath sounds: Normal breath sounds. No wheezing. Abdominal:      General: Abdomen is flat. Bowel sounds are normal. There is no distension. Palpations: Abdomen is soft. Tenderness: There is no abdominal tenderness. There is no right CVA tenderness or left CVA tenderness. Musculoskeletal:         General: Tenderness present. No swelling or deformity.  Normal range of motion. Cervical back: Normal range of motion and neck supple. No rigidity or tenderness. Comments: No step-offs or deformities to the spine. No midline spinal tenderness. Patient has pain in the left lower lumbar paraspinal musculature and pain over the SI joint. This produces a shooting pain down the patient's left lower extremity. Patient has good range of motion of the spine with extension and flexion as well as rotation. Distal pulses 2+ bilaterally. Lymphadenopathy:      Cervical: No cervical adenopathy. Skin:     General: Skin is warm and dry. Capillary Refill: Capillary refill takes less than 2 seconds. Coloration: Skin is not pale. Findings: No rash. Neurological:      General: No focal deficit present. Mental Status: He is alert and oriented to person, place, and time. Cranial Nerves: No cranial nerve deficit. Psychiatric:         Mood and Affect: Mood normal.         Behavior: Behavior normal.         Thought Content:  Thought content normal.     Diagnostic Results     RADIOLOGY:  No orders to display     LABS:   Results for orders placed or performed during the hospital encounter of 07/18/22   Urinalysis with Reflex to Culture    Specimen: Urine   Result Value Ref Range    Color, UA Yellow Straw/Yellow    Clarity, UA Clear Clear    Glucose, Ur Negative Negative mg/dL    Bilirubin Urine Negative Negative    Ketones, Urine Negative Negative mg/dL    Specific Gravity, UA 1.015 1.005 - 1.030    Blood, Urine TRACE-INTACT (A) Negative    pH, UA 6.5 5.0 - 8.0    Protein, UA Negative Negative mg/dL    Urobilinogen, Urine 0.2 <2.0 E.U./dL    Nitrite, Urine Negative Negative    Leukocyte Esterase, Urine Negative Negative    Microscopic Examination YES     Urine Type NotGiven     Urine Reflex to Culture Not Indicated    Microscopic Urinalysis   Result Value Ref Range    WBC, UA 0-2 0 - 5 /HPF    RBC, UA 0-2 0 - 4 /HPF    Renal Epithelial, UA 0-1 0 - 1 /HPF     ED BEDSIDE ULTRASOUND:  No results found. RECENT VITALS:  BP: 120/87, Temp: 98.3 °F (36.8 °C), Heart Rate: 69, Resp: 17, SpO2: 99 %     Procedures     None    ED Course     Nursing Notes, Past Medical Hx,Past Surgical Hx, Social Hx, Allergies, and Family Hx were reviewed. The patient was given the following medications:  Orders Placed This Encounter   Medications    DISCONTD: methylPREDNISolone (MEDROL DOSEPACK) 4 MG tablet     Sig: Take by mouth as directed per box directions     Dispense:  21 tablet     Refill:  0    methylPREDNISolone (MEDROL DOSEPACK) 4 MG tablet     Sig: Take by mouth as directed per box directions     Dispense:  21 tablet     Refill:  0    methocarbamol (ROBAXIN) 500 MG tablet     Sig: Take 1 tablet by mouth in the morning and 1 tablet at noon and 1 tablet in the evening and 1 tablet before bedtime. Do all this for 5 days. Dispense:  20 tablet     Refill:  0         CONSULTS:  None    MEDICAL DECISION MAKING / ASSESSMENT / PLAN     Kavin Ayoub is a 47 y.o. male who presents emergency department with back pain. On my exam the patient is nontoxic-appearing. He is afebrile without tachycardia, tachypnea or hypoxia. He is normotensive. He has tenderness to palpation of the lumbar paraspinal musculature in the left lower back and over the SI joint which produces a shooting type pain down the left lower extremity. Good range of motion of the spine as well as strength and sensation of the upper and lower extremities bilaterally. Patient ambulates without difficulty. Patient denies recent trauma. The patient denies urinary incontinence, urinary retention, and numbness in the extremities. The patient is neurologically intact, sensation is intact in the lower extremities, dorsalis pedal pulses 2+ bilaterally, and achilles reflex intact bilaterally.      I do not feel imaging is necessary at this point based on location the pain as well as pain has been going on for the past 2 months without any trauma to the spine or direct blow to the back. Lower suspicion for renal stones given that the patient did not have any CVA tenderness. Pain appears to be lower than the level of the left kidney. Did obtain urinalysis which showed trace blood with no RBCs on mircroscopy. No signs of infection. It is my impression that based on history and negative physical exam findings, the patient is not at risk for Vert Fx, Herniated Disk, Epidural Abscess/hematoma, diskitis, osteomyelitis, Cauda Equina Syndrome, AAA Rupture, Pyelo, or other spinal injury. There are no subjective complaints nor objective physical examination findings to suggest a referred vascular, genitourinary, intra-abdominal nor gastrointestinal etiology. At this point, the patient's nontraumatic back pain is likely musculoskeletal in nature. Patient had Lidoderm patches in place as well as had taken pain medication prior to arrival. Patient will be prescribed steroid pack for symptoms. Patient can use heat. The patient will be given back stretching exercises, and instructed to follow up with sports medicine as needed. The patient should return to the ED if the back pain worsens, or if they experience incontinence, numbness or tingling in the legs, or inability to ambulate. Patient is agreeable and understanding to this plan of care. This patient was also evaluated by the attending physician. All care plans were discussed and agreed upon. Clinical Impression     1.  Chronic left-sided low back pain with sciatica, sciatica laterality unspecified        Disposition     PATIENT REFERRED TO:  Karla Stanley MD  [address] [place] [address]  [phone]    Schedule an appointment as soon as possible for a visit   As needed    Karla Stanley MD  [address] [place] [address]          DISCHARGE MEDICATIONS:  Discharge Medication List as of 7/18/2022 11:36 AM DISPOSITION Decision To Discharge 07/18/2022 11:32:34 AM     Federica Lee PA-C  07/18/22 5945

## 2022-07-18 NOTE — ED PROVIDER NOTES
ED Attending Attestation Note     Date of evaluation: 7/18/2022    This patient was seen by the advance practice provider. I have seen and examined the patient, agree with the workup, evaluation, management and diagnosis. The care plan has been discussed. My assessment reveals patient with musculoskeletal back pain with radiation in the leg consistent with sciatica. The patient has no abnormal neurologic findings at this time but given the radicular component, will be started on steroids along with muscle relaxants.      Gail Kennedy MD  07/18/22 [id number]

## 2022-07-26 ENCOUNTER — HOSPITAL ENCOUNTER (EMERGENCY)
Age: 52
Discharge: HOME | End: 2022-07-26
Attending: STUDENT IN AN ORGANIZED HEALTH CARE EDUCATION/TRAINING PROGRAM
Payer: COMMERCIAL

## 2022-07-26 VITALS
HEART RATE: 68 BPM | TEMPERATURE: 97.9 F | BODY MASS INDEX: 26.48 KG/M2 | RESPIRATION RATE: 18 BRPM | SYSTOLIC BLOOD PRESSURE: 122 MMHG | OXYGEN SATURATION: 99 % | WEIGHT: 185 LBS | HEIGHT: 70 IN | DIASTOLIC BLOOD PRESSURE: 90 MMHG

## 2022-07-26 DIAGNOSIS — N34.2 URETHRITIS: Primary | ICD-10-CM

## 2022-07-26 LAB
BILIRUBIN URINE: NEGATIVE
BLOOD, URINE: ABNORMAL
CLARITY: CLEAR
COLOR: YELLOW
EPITHELIAL CELLS, UA: ABNORMAL /HPF (ref 0–5)
GLUCOSE URINE: NEGATIVE MG/DL
KETONES, URINE: NEGATIVE MG/DL
LEUKOCYTE ESTERASE, URINE: NEGATIVE
MICROSCOPIC EXAMINATION: YES
MUCUS: ABNORMAL /LPF
NITRITE, URINE: NEGATIVE
PH UA: 6 (ref 5–8)
PROTEIN UA: ABNORMAL MG/DL
RBC UA: ABNORMAL /HPF (ref 0–4)
SPECIFIC GRAVITY UA: >=1.03 (ref 1–1.03)
URINE REFLEX TO CULTURE: ABNORMAL
URINE TYPE: ABNORMAL
UROBILINOGEN, URINE: 0.2 E.U./DL
WBC UA: ABNORMAL /HPF (ref 0–5)

## 2022-07-26 PROCEDURE — 99284 EMERGENCY DEPT VISIT MOD MDM: CPT

## 2022-07-26 PROCEDURE — 87491 CHLMYD TRACH DNA AMP PROBE: CPT

## 2022-07-26 PROCEDURE — 6360000002 HC RX W HCPCS: Performed by: STUDENT IN AN ORGANIZED HEALTH CARE EDUCATION/TRAINING PROGRAM

## 2022-07-26 PROCEDURE — 81001 URINALYSIS AUTO W/SCOPE: CPT

## 2022-07-26 PROCEDURE — 96372 THER/PROPH/DIAG INJ SC/IM: CPT

## 2022-07-26 PROCEDURE — 87591 N.GONORRHOEAE DNA AMP PROB: CPT

## 2022-07-26 RX ORDER — DOXYCYCLINE HYCLATE 100 MG/1
100 CAPSULE ORAL 2 TIMES DAILY
Qty: 14 CAPSULE | Refills: 0 | Status: SHIPPED | OUTPATIENT
Start: 2022-07-26 | End: 2022-08-02

## 2022-07-26 RX ORDER — CEFTRIAXONE 500 MG/1
500 INJECTION, POWDER, FOR SOLUTION INTRAMUSCULAR; INTRAVENOUS ONCE
Status: COMPLETED | OUTPATIENT
Start: 2022-07-26 | End: 2022-07-26

## 2022-07-26 RX ADMIN — CEFTRIAXONE SODIUM 500 MG: 500 INJECTION, POWDER, FOR SOLUTION INTRAMUSCULAR; INTRAVENOUS at 10:14

## 2022-07-26 ASSESSMENT — PAIN - FUNCTIONAL ASSESSMENT
PAIN_FUNCTIONAL_ASSESSMENT: NONE - DENIES PAIN
PAIN_FUNCTIONAL_ASSESSMENT: NONE - DENIES PAIN

## 2022-07-26 NOTE — ED PROVIDER NOTES
[address] [facility]          ATTENDING PHYSICIAN NOTE       Date of evaluation: 7/26/2022    Chief Complaint     Exposure to STD (Concern for std d/t discharge and nausea x 2 weeks )      History of Present Illness     Reed Chavarria is a 47 y.o. male who presents with discharge    Patient reports intermittent nausea without vomiting for last 2 weeks. He has also noted intermittent discharge from his penis, which has been more noticeable for the last 2 days. He is not totally certain if he had had similar discharge earlier. There is no blood in the discharge, and is green-yellow in color. He has not had any testicular pain. He has not had any groin pain or rash. He denies any new sexual partners. He endorses sexual contact with his wife, but is concerned that due to a separation she may not have been faithful. He states that he has been able to have bowel movements, but they have been smaller and \"dehydrated\" but otherwise without bowel habits change. No blood in his stool or melena. He denies dysuria, urinary frequency, or hematuria. He denies abdominal pain. PMHx: sciatica, +tobacco use, no hx abd surgery  SH: , +tobacco, and as below    Review of Systems       ROS:   Positive f as per HPI. Negative for:    -Constitutional: fevers, chills    -Eyes:   eye pain, eye discharge    -Ears/Nose/Throat: Ear pain, ear discharge    -Cardiovascular: CP, cyanosis    -Respiratory:  cough, SOB    -Gastrointestinal: Abd pain, vomiting, melena, hematochezia    -Genitourinary: hematuria, dysuria, urinary frequency    -Neurological: numbness or weakness    -Skin:   Rash, pruritis,     -Hematologic: easy bleeding, easy bruising    -Musculoskeletal:  joint swelling, joint redness    Past Medical, Surgical, Family, and Social History     He has no past medical history on file. He has no past surgical history on file. His family history is not on file.   He reports that he has been smoking cigarettes. He has been smoking an average of .25 packs per day. He has never used smokeless tobacco. He reports current alcohol use of about 4.0 standard drinks per week. He reports current drug use. Drugs: Marijuana (Weed) and Opiates . Medications     Discharge Medication List as of 7/26/2022 10:45 AM          Allergies     He has No Known Allergies. Physical Exam     INITIAL VITALS: BP: 127/82, Temp: 97.9 °F (36.6 °C), Heart Rate: 84, Resp: 16, SpO2: 100 %     General:  Well appearing. No acute distress. Non-toxic appearing    Eyes:  Pupils equally round, No discharge from eyes. ENT:  No discharge from nose. OP clear. Neck:  Supple. Nontender. Pulmonary:   Non-labored breathing. Symmetric chest wall excursion  Cardiac:  Regular rate and rhythm. Abdomen:  Soft. Non-tender. Non-distended. No masses. : Normal external male genitalia. Testicles without overlying skin changes such as erythema, warmth, edema, skin thickening. Testicles are nontender to palpation including the epididymal region. The groin is free of any rash, lesion, or blister. The penile shaft is free of any rash lesion or blister. The groin is free of any lymphadenopathy or tenderness to palpation. Please note that eliu Baez RN was present for this portion of exam.    Musculoskeletal:  No long bone deformity. No ankle or wrist deformity. Vascular:  Extremities warm and perfused. Skin:  No rash. Warm. Neuro: Alert and conversant. Speech and mentation normal.    Gait normal    Extremities:  No peripheral edema. LE symmetric.     Diagnostic Results     EKG   None indicated    RADIOLOGY:  No orders to display       LABS:   Results for orders placed or performed during the hospital encounter of 07/26/22   Urinalysis with Reflex to Culture    Specimen: Urine, clean catch   Result Value Ref Range    Color, UA Yellow Straw/Yellow    Clarity, UA Clear Clear    Glucose, Ur Negative Negative mg/dL requested the testing. Patient was treated with ceftriaxone 500mg IM, and prescribed doxycycline BID x7d empirically. The patient was counseled on safe sex practices and adequate treatment. The patient was recommended syphilis and HIV screening as an outpatient    As the patient is well-appearing and able to tolerate PO intake we do feel they are appropriate for discharge to home in good condition with strict return precautions and customary discharge teaching. Clinical Impression     1. Urethritis           Disposition     PATIENT REFERRED TO:  No follow-up provider specified. DISCHARGE MEDICATIONS:  Discharge Medication List as of 7/26/2022 10:45 AM        START taking these medications    Details   doxycycline hyclate (VIBRAMYCIN) 100 MG capsule Take 1 capsule by mouth in the morning and 1 capsule before bedtime. Do all this for 7 days. , Disp-14 capsule, R-0Print             DISPOSITION Decision To Discharge 07/26/2022 10:35:10 AM         Joe Gregorio MD  07/26/22 6461

## 2022-07-26 NOTE — DISCHARGE INSTRUCTIONS
We saw you in the hospital for urethritis     You were treated with ceftriaxone. You need to take a pill called doxycycline when you go home, 2 times a day    You need to see your regular doctor in 2-3 days to be checked. Pending  urine culture and gonorrhea and Chlamydia testing can be [facility] by [facility] or via your primary care doctor or by contacting the hospital.    You should return to the emergency department if your symptoms worsen or do not resolve. In addition, return if:  - You have a fever (greater than 101 degrees)  - You have chest pain, shortness of breath, abdominal pain, or uncontrollable vomiting  - You are unable to eat or drink  - You pass out  - You have difficulty moving your arms or legs   - You have difficulty speaking or slurred speech  - Or you have any concern that you feel needs acute physician evaluation. You should find a regular doctor to see. You can look at the list below for regular doctors that can see you. Talk to our  if you have more questions. [facility]) locations:  --[facility]) -- [facility] [address]. [address] [place] [address]  Get Directions  Tel: [phone]    --[facility]) -- [facility] [address]  [address].   [address] [place] [address], [address] [place] [address]  Get Directions  Tel: Brook A SLIDING FEE TO 1 [facility]       Address-Location  Phone Number-Call [address] [facility] [address] [place] [address] [phone]   For Homeless population only   [facility] [phone] [phone]  [address] [facility] [phone]     [address] [place] [address],[address]   . [address] [address] Terrence Mas [address] Kayleigh Mari 95

## 2022-07-28 LAB
C. TRACHOMATIS DNA ,URINE: NEGATIVE
N. GONORRHOEAE DNA, URINE: NEGATIVE

## 2022-10-28 ENCOUNTER — HOSPITAL ENCOUNTER (EMERGENCY)
Age: 52
Discharge: HOME | End: 2022-10-28
Attending: EMERGENCY MEDICINE
Payer: COMMERCIAL

## 2022-10-28 ENCOUNTER — APPOINTMENT (OUTPATIENT)
Dept: GENERAL RADIOLOGY | Age: 52
End: 2022-10-28
Payer: COMMERCIAL

## 2022-10-28 VITALS
BODY MASS INDEX: 23.83 KG/M2 | TEMPERATURE: 97.9 F | WEIGHT: 166.45 LBS | SYSTOLIC BLOOD PRESSURE: 122 MMHG | OXYGEN SATURATION: 98 % | HEIGHT: 70 IN | RESPIRATION RATE: 20 BRPM | HEART RATE: 75 BPM | DIASTOLIC BLOOD PRESSURE: 79 MMHG

## 2022-10-28 DIAGNOSIS — M25.561 ACUTE PAIN OF RIGHT KNEE: Primary | ICD-10-CM

## 2022-10-28 DIAGNOSIS — S83.8X1A ACUTE MEDIAL MENISCAL INJURY OF KNEE, RIGHT, INITIAL ENCOUNTER: ICD-10-CM

## 2022-10-28 PROCEDURE — 99283 EMERGENCY DEPT VISIT LOW MDM: CPT

## 2022-10-28 PROCEDURE — 6370000000 HC RX 637 (ALT 250 FOR IP): Performed by: STUDENT IN AN ORGANIZED HEALTH CARE EDUCATION/TRAINING PROGRAM

## 2022-10-28 PROCEDURE — 73562 X-RAY EXAM OF KNEE 3: CPT

## 2022-10-28 RX ORDER — LIDOCAINE HYDROCHLORIDE 10 MG/ML
5 INJECTION, SOLUTION EPIDURAL; INFILTRATION; INTRACAUDAL; PERINEURAL ONCE
Status: DISCONTINUED | OUTPATIENT
Start: 2022-10-28 | End: 2022-10-28

## 2022-10-28 RX ORDER — ACETAMINOPHEN 500 MG
1000 TABLET ORAL
Status: COMPLETED | OUTPATIENT
Start: 2022-10-28 | End: 2022-10-28

## 2022-10-28 RX ADMIN — ACETAMINOPHEN 1000 MG: 500 TABLET ORAL at 20:52

## 2022-10-28 ASSESSMENT — PAIN SCALES - GENERAL
PAINLEVEL_OUTOF10: 10
PAINLEVEL_OUTOF10: 10

## 2022-10-28 ASSESSMENT — PAIN DESCRIPTION - DESCRIPTORS
DESCRIPTORS: DISCOMFORT
DESCRIPTORS: ACHING

## 2022-10-28 ASSESSMENT — PAIN DESCRIPTION - LOCATION
LOCATION: KNEE
LOCATION: KNEE

## 2022-10-28 ASSESSMENT — PAIN DESCRIPTION - ORIENTATION
ORIENTATION: RIGHT
ORIENTATION: RIGHT

## 2022-10-28 ASSESSMENT — PAIN DESCRIPTION - ONSET: ONSET: ON-GOING

## 2022-10-28 ASSESSMENT — PAIN - FUNCTIONAL ASSESSMENT
PAIN_FUNCTIONAL_ASSESSMENT: 0-10
PAIN_FUNCTIONAL_ASSESSMENT: PREVENTS OR INTERFERES SOME ACTIVE ACTIVITIES AND ADLS

## 2022-10-28 ASSESSMENT — PAIN DESCRIPTION - PAIN TYPE: TYPE: ACUTE PAIN

## 2022-10-28 ASSESSMENT — PAIN DESCRIPTION - FREQUENCY: FREQUENCY: CONTINUOUS

## 2022-10-29 NOTE — ED PROVIDER NOTES
ED Attending Attestation Note     Date of evaluation: 10/28/2022    This patient was seen by the resident. I have seen and examined the patient, agree with the workup, evaluation, management and diagnosis. The care plan has been discussed. My assessment reveals a 71-year-old male presenting to the emergency department with a complaint of right knee pain. On examination the patient has tenderness to palpation along the medial joint line with intact range of motion of the right knee.      Riki Robbins MD  10/28/22 2050

## 2022-10-29 NOTE — DISCHARGE INSTRUCTIONS
You presented to the emergency department today with pain of your right knee. Overall, your symptoms are most consistent with likely an injury to the meniscus of your right knee. As such, we are providing a referral for you to follow-up with orthopedic doctor for ongoing evaluation and management. Please call the number provided to schedule your appointment. For ongoing pain at home, please take Motrin and Tylenol. Additionally, you can continue to use Lidoderm patches as needed. Elevate the affected leg and continue wrapping it in an Ace wrap. We have provided you with crutches for comfort but please continue to weight-bear as tolerated on the affected extremity. Please return to the emergency department if you develop new or worsening symptoms, significant worsening pain, significant worsening of your swelling, high fevers or chills, spreading redness over your leg, or any other symptom that concerns you.

## 2022-10-29 NOTE — ED PROVIDER NOTES
[address] [facility]          EM RESIDENT NOTE     Date of Evaluation: 10/28/2022    Chief Complaint     Knee Pain (Pt presents to the ED via triage w/ complaints of right knee pain and swelling. Pt reports waking up 30 days ago to knee swelling and pain. Pt reports that the pain has increased today and rates pain 10/10 )    History of Present Illness     Efrem Alonzo is a 47 y.o. male with PMHx osteoarthritis, constipation who presents with right knee pain and swelling x30 days. Patient denies any inciting trauma or falls to his right knee but states that he woke up 1 morning and his right knee was swollen, warm, and painful. He continues to ambulate on the affected extremity and bear weight. He continues to to fully range the affected joint. He denies any associated fevers or chills. He denies any history joint infections or gout. He does endorse drinking approximately 6 beers a week though he states that he had a much more significant drinking history in the past.  He has been treating his symptoms with occasional Percocets and pain patches. On ROS, he denies any fevers, chills, sore throat, rhinorrhea, nasal congestion, cough, shortness of breath, chest pain, abdominal pain, nausea/vomiting, diarrhea, dysuria, lower extremity edema, headache, loss of consciousness. Review of Systems     Review of Systems   All other systems reviewed and are negative. Past Medical, Surgical, Family, and Social History     He has no past medical history on file. He has no past surgical history on file. His family history is not on file. He reports that he has been smoking cigarettes. He has been smoking an average of .25 packs per day. He has never used smokeless tobacco. He reports current alcohol use of about 4.0 standard drinks per week. He reports current drug use. Drugs: Marijuana (Weed) and Opiates .     Medications     Previous Medications    No medications on file Allergies     He has No Known Allergies. Physical Exam     INITIAL VITALS:   BP: 122/79, Temp: 97.9 °F (36.6 °C), Heart Rate: 75, Resp: 20, SpO2: 98 %     General: 47 y.o. male, well-appearing; in no apparent distress. Head: Normocephalic, atraumatic. Eyes: Anicteric. PERRL, EOMI.  ENT: No nasal discharge. Mucous membranes are moist.  Neck: Supple, full ROM, trachea midline. Pulmonary: Non-labored breathing. Lungs clear to auscultation bilaterally with symmetric aeration. No wheezes/rales/rhonchi. Cardiac: Regular rate and rhythm. No murmurs/rubs/gallops. Abdomen: Soft, non-tender, non-distended. No rebound or guarding. Musculoskeletal: No long bone extremity deformity. Right knee warm and swollen without any appreciable joint effusion. Mild overlying erythema. Tenderness to palpation primarily over the medial joint line. Full active and passive range of motion about the right knee. Extremities: Warm and well-perfused. No peripheral edema. Skin: No rashes or bruising. Neuro: Alert and oriented x3. Speech normal. Moves UE and LE spontaneously and symmetrically. Psych: Mood and affect appropriate for situation. Diagnostic Results     EKG:  None    RADIOLOGY:  XR KNEE RIGHT (3 VIEWS)   Final Result   Impression:   No acute osseous injury. LABS:  No results found for this visit on 10/28/22. ED BEDSIDE ULTRASOUND:  None    RECENT VITALS:   BP: 122/79, Temp: 97.9 °F (36.6 °C), Heart Rate: 75,Resp: 20, SpO2: 98 %     Procedures     None    ED Course     Nursing Notes, Past Medical Hx, Past Surgical Hx, Social Hx, Allergies, and Family Hx were reviewed. PATIENT GIVEN FOLLOWING MEDICATIONS:  Orders Placed This Encounter   Medications    acetaminophen (TYLENOL) tablet 1,000 mg    DISCONTD: lidocaine PF 1 % injection 5 mL     CONSULTS:  None    MEDICAL DECISION MAKING / ASSESSMENT / Don  is a 47 y.o. male who presents with subacute right knee pain.   On presentation, patient is well-appearing, afebrile, and hemodynamically stable. Low concern for septic joint given patient able to fully range his knee and continues to be of to bear weight without difficulty. Given joint is moderately swollen and warm, gout remains on my differential at this time though no appreciable effusion. Given point tenderness over the medial joint line, medial meniscal injury also remains on the differential at this time. We will obtain XR imaging and treat symptomatically with Tylenol. Pending work-up will consider arthrocentesis. Will defer NSAIDs for treatment at this time given patient has previously elevated creatinine's on laboratory testing from OSH. ED Course as of 10/28/22 2111   Fri Oct 28, 2022   2102 XR KNEE RIGHT (3 VIEWS)  No acute osseous injury. [CB]   2102 Overall, given lack of any effusion on his x-ray or exam, his presentation is most consistent with likely medial meniscal injury given point tenderness over the medial joint line. As such, will provide patient with crutches and Ace wrap his affected knee. Will discharge to home with recommendation for orthopedic follow-up [CB]      ED Course User Index  [CB] Samantha Bonilla MD     This patient was also evaluated by the attending physician. All care plans were discussed and agreed upon. Clinical Impression     1. Acute pain of right knee    2. Acute medial meniscal injury of knee, right, initial encounter      Disposition     PATIENT REFERRED TO:  Tremayne Mcdonnell MD  [address] [place] [address]  [phone]    Schedule an appointment as soon as possible for a visit     DISCHARGE MEDICATIONS:  New Prescriptions    No medications on file     DISPOSITION Discharge - Pending Orders Complete 10/28/2022 09:09:40 PM    At this time the patient has been deemed safe for discharge. Risks, benefits, and alternatives were discussed.  My customary discharge instructions including strict return precautions for worsening or new symptoms have been communicated. The patient was advised to follow up with orthopedics (referral provided). Rae Corbett MD   Emergency Medicine, PGY-2    This note was dictated using voice-recognition software, which occasionally leads to inadvertent typographic errors.        Rae Corbett MD  10/28/22 [id number]